# Patient Record
Sex: MALE | Race: WHITE
[De-identification: names, ages, dates, MRNs, and addresses within clinical notes are randomized per-mention and may not be internally consistent; named-entity substitution may affect disease eponyms.]

---

## 2019-08-12 LAB
ANION GAP SERPL CALC-SCNC: 13.6 MMOL/L (ref 8–16)
BASOPHILS NFR BLD AUTO: 0.2 % (ref 0–2)
BUN SERPL-MCNC: 26 MG/DL (ref 7–18)
CALCIUM SERPL-MCNC: 9.8 MG/DL (ref 8.5–10.1)
CHLORIDE SERPL-SCNC: 103 MMOL/L (ref 98–107)
CO2 SERPL-SCNC: 26.8 MMOL/L (ref 21–32)
CREAT SERPL-MCNC: 1.6 MG/DL (ref 0.6–1.3)
EOSINOPHIL NFR BLD: 1.9 % (ref 0–7)
ERYTHROCYTE [DISTWIDTH] IN BLOOD BY AUTOMATED COUNT: 13.3 % (ref 11.5–14.5)
GLUCOSE SERPL-MCNC: 87 MG/DL (ref 74–106)
HCT VFR BLD CALC: 40.9 % (ref 42–54)
HGB BLD-MCNC: 14.5 G/DL (ref 13.5–17.5)
IMM GRANULOCYTES NFR BLD: 0.3 % (ref 0–5)
LYMPHOCYTES NFR BLD AUTO: 24.1 % (ref 15–50)
MCH RBC QN AUTO: 31.6 PG (ref 26–34)
MCHC RBC AUTO-ENTMCNC: 35.5 G/DL (ref 31–37)
MCV RBC: 89.1 FL (ref 80–100)
MONOCYTES NFR BLD: 9.2 % (ref 2–11)
NEUTROPHILS NFR BLD AUTO: 64.3 % (ref 40–80)
OSMOLALITY SERPL CALC.SUM OF ELEC: 279 MOSM/KG (ref 275–300)
PLATELET # BLD: 222 10X3/UL (ref 130–400)
PMV BLD AUTO: 10.9 FL (ref 7.4–10.4)
POTASSIUM SERPL-SCNC: 5.4 MMOL/L (ref 3.5–5.1)
RBC # BLD AUTO: 4.59 10X6/UL (ref 4.2–6.1)
SODIUM SERPL-SCNC: 138 MMOL/L (ref 136–145)
WBC # BLD AUTO: 6.2 10X3/UL (ref 4.8–10.8)

## 2019-08-13 ENCOUNTER — HOSPITAL ENCOUNTER (OUTPATIENT)
Dept: HOSPITAL 84 - D.OPS | Age: 84
Discharge: HOME | End: 2019-08-13
Attending: UROLOGY
Payer: MEDICARE

## 2019-08-13 VITALS — HEIGHT: 69 IN | BODY MASS INDEX: 28.14 KG/M2 | WEIGHT: 190 LBS

## 2019-08-13 DIAGNOSIS — R97.20: Primary | ICD-10-CM

## 2019-08-13 DIAGNOSIS — N40.1: ICD-10-CM

## 2019-08-13 DIAGNOSIS — Z01.812: ICD-10-CM

## 2019-08-13 DIAGNOSIS — N32.89: ICD-10-CM

## 2019-08-13 DIAGNOSIS — N13.8: ICD-10-CM

## 2019-08-13 DIAGNOSIS — N32.3: ICD-10-CM

## 2019-08-13 NOTE — OP
PATIENT NAME:  VLADIMIR CARLIN JR                           MEDICAL RECORD: [id number]
:37                                             LOCATION:D.OPS          
                                                         ADMISSION DATE:        
SURGEON:  REMBERTO STOVER MD              
 
 
DATE OF OPERATION:  2019
 
SURGEON:  Remberto Stover MD
 
ANESTHESIA:  TIVA by Tor Trevino CRNA.
 
DIAGNOSES:  Elevated PSA of 5.6 on 2016 and obstructive BPH symptoms.
 
PROCEDURE:  Cystoscopy, transrectal ultrasound, and prostate biopsy.
 
FINDINGS:  On cystoscopy, bilateral lateral lobe hyperplasia with obstruction. 
There is a long prostatic urethra.  A small median lobe was seen.  The bladder
was heavily trabeculated with cellules and diverticula, no bladder tumors were
seen.  Single ureteral orifices bilaterally.  Transrectal ultrasound 45 gram
prostate with hypoechoic right base.
 
BLOOD LOSS:  Minimal.
 
SPECIMENS:  Prostate biopsy cores.
 
CLINICAL HISTORY:  This is an 84-year-old male with bladder outlet obstruction. 
His last PSA was found to be elevated at 5.6.  He has symptoms of urinary
frequency 12 times a day and with tamsulosin the frequency came down to 6-8
times per day.  He has nocturia times 2-3, which went down to nocturia times 1
with tamsulosin.  He is taking the tamsulosin twice a day and this is giving him
dizziness.  He also has urge incontinence, hesitancy and dribbling postvoid. 
His IPSS score is 14 and quality of life score is 5 on b.i.d. tamsulosin.  He
does not have a family history of prostate cancer.  On rectal examination, he
has an enlarged prostate about 60 grams with a nodule on the right prostatic
lateral lobe.  He is not allergic to any medications.  We gave him Ancef on call
to the OR.
 
DESCRIPTION OF PROCEDURE:  The patient was given IV sedation.  He was placed
into lithotomy position and prepped and draped.  The 17-French cystoscope was
used for visualization.  There are no penile urethral strictures.  The prostatic
urethra was obstructed due to the lateral lobes, which are long.  Some minimal
median lobe was seen.  The other findings are as outlined above.  The bladder
was then emptied through the cystoscope sheath and the scope was removed.
 
We then introduced the transrectal ultrasound probe and prostate size
measurements were obtained.  A size estimate of 45 grams was seen.  At the right
base was a hypoechoic area.  Sextant biopsies were obtained with at least 3
cores from each sextant.  Once all the specimens were obtained, we terminated
the procedure.  The patient was brought back to the preoperative holding area. 
I will see him in followup in 2 weeks' time.
 
TRANSINT:[id number] Voice Confirmation ID: [id number] DOCUMENT ID: [id number]
 
 
 
OPERATIVE REPORT                               [id number]    VLADIMIR CARLIN JR, REMBERTO ARBOLEDA MD              
 
 
 
Electronically Signed by REMBERTO STOVER on 19 at 1238
 
 
 
 
 
 
 
 
 
 
 
 
 
 
 
 
 
 
 
 
 
 
 
 
 
 
 
 
 
 
 
 
 
 
 
 
 
 
 
 
 
CC:                                                             [id number]
DICTATION DATE: 19 1155     :     19 1236      [facility] 
                                                                      [address] [place] [address]

## 2019-10-15 LAB
ANION GAP SERPL CALC-SCNC: 15.5 MMOL/L (ref 8–16)
APPEARANCE UR: CLEAR
APTT BLD: 42.9 SECONDS (ref 22.8–39.4)
BILIRUB SERPL-MCNC: NEGATIVE MG/DL
BUN SERPL-MCNC: 24 MG/DL (ref 7–18)
CALCIUM SERPL-MCNC: 9.1 MG/DL (ref 8.5–10.1)
CHLORIDE SERPL-SCNC: 106 MMOL/L (ref 98–107)
CO2 SERPL-SCNC: 27.5 MMOL/L (ref 21–32)
COLOR UR: YELLOW
CREAT SERPL-MCNC: 1.4 MG/DL (ref 0.6–1.3)
ERYTHROCYTE [DISTWIDTH] IN BLOOD BY AUTOMATED COUNT: 13 % (ref 11.5–14.5)
GLUCOSE SERPL-MCNC: 148 MG/DL (ref 74–106)
GLUCOSE SERPL-MCNC: NEGATIVE MG/DL
HCT VFR BLD CALC: 38.2 % (ref 42–54)
HGB BLD-MCNC: 12.8 G/DL (ref 13.5–17.5)
INR PPP: 1.41 (ref 0.85–1.17)
KETONES UR STRIP-MCNC: NEGATIVE MG/DL
MCH RBC QN AUTO: 30.8 PG (ref 26–34)
MCHC RBC AUTO-ENTMCNC: 33.5 G/DL (ref 31–37)
MCV RBC: 92 FL (ref 80–100)
NITRITE UR-MCNC: NEGATIVE MG/ML
OSMOLALITY SERPL CALC.SUM OF ELEC: 293 MOSM/KG (ref 275–300)
PH UR STRIP: 5 [PH] (ref 5–6)
PLATELET # BLD: 233 10X3/UL (ref 130–400)
PMV BLD AUTO: 11.2 FL (ref 7.4–10.4)
POTASSIUM SERPL-SCNC: 5 MMOL/L (ref 3.5–5.1)
PROT UR-MCNC: NEGATIVE MG/DL
PROTHROMBIN TIME: 16.6 SECONDS (ref 11.6–15)
RBC # BLD AUTO: 4.15 10X6/UL (ref 4.2–6.1)
SODIUM SERPL-SCNC: 144 MMOL/L (ref 136–145)
SP GR UR STRIP: 1.01 (ref 1–1.02)
UROBILINOGEN UR-MCNC: NORMAL MG/DL
WBC # BLD AUTO: 4 10X3/UL (ref 4.8–10.8)

## 2019-10-17 ENCOUNTER — HOSPITAL ENCOUNTER (OUTPATIENT)
Dept: HOSPITAL 84 - D.OPS | Age: 84
Discharge: HOME | End: 2019-10-17
Attending: UROLOGY
Payer: MEDICARE

## 2019-10-17 VITALS — WEIGHT: 189 LBS | HEIGHT: 69 IN | BODY MASS INDEX: 27.99 KG/M2

## 2019-10-17 VITALS — DIASTOLIC BLOOD PRESSURE: 66 MMHG | SYSTOLIC BLOOD PRESSURE: 135 MMHG

## 2019-10-17 DIAGNOSIS — N13.8: ICD-10-CM

## 2019-10-17 DIAGNOSIS — N40.1: Primary | ICD-10-CM

## 2019-10-17 NOTE — NUR
0845-DISCHARGE CRITERIA MET. TEACHING ON PALACIOS DRAINING AND
POSITIONING.VERBALIZED UNDERSTANDING WITHOTH FURTHER QUESTIONS OR
CONCERNS. ESCORTED OUT VIA W/C WITH WIFE AWAITING TO DRIVE HOME.

## 2019-10-17 NOTE — OP
PATIENT NAME:  VLADIMIR CARLIN JR                           MEDICAL RECORD: [id number]
:37                                             LOCATION:D.OPS          
                                                         ADMISSION DATE:        
SURGEON:  REMBERTO STOVER MD              
 
 
DATE OF OPERATION:  10/17/2019
 
SURGEON:  Remberto Stover MD
 
ANESTHESIA:  TIVA by Onur Lou CRNA
 
DIAGNOSIS:  Obstructive benign prostatic hyperplasia.  PSA 5.6.
 
PROCEDURE:  Transrectal ultrasound shows a 45 gram prostate.  IPSS score is 14
and quality of life score is 5 on tamsulosin b.i.d.
 
PROCEDURE:  UroLift times 6 implants.
 
FINDINGS:  Long obstructive lateral prostatic lobes with no median lobe.  No
bladder tumors.
 
ESTIMATED BLOOD LOSS:  None.
 
CLINICAL HISTORY:  This is an 84-year-old male, who has obstructive BPH
symptoms.  He has an elevated PSA of 5.6 on 2016.  He has urinary
frequency 12 times a day and nocturia times 2-3.  This improved when he was
taking tamsulosin on a b.i.d. basis, but it has made him dizzy.  He has urge
incontinence also.  He has a prostate biopsy performed and the pathology was
benign.  On cystoscopy at the time of prostate biopsy, he was found to have
large long obstructive lateral lobes.  He is on Plavix and Coumadin for atrial
fibrillation.  We obtained cardiology clearance from Dr. Durán.  He has held
his Plavix and Coumadin for 5 days.  He comes today to have the UroLift
procedure done.  He is not allergic to any medications.  He was given Ancef on
call to the OR.
 
DESCRIPTION OF PROCEDURE:  The patient was given IV sedation.  After been given
some fentanyl, he had bradycardia down to about 30.  This was managed by
anesthesia, CRNA.  Once he was more stabilized, his legs were placed into
stirrups and he was prepped and draped.  The UroLift scope was introduced.  I
placed two UroLift units 1.5 cm distal to the bladder neck at the anterolateral
sulcus of the lateral lobe of the prostate.  This was placed with 1 unit on each
side.  Another 2 units were placed at the level of the verumontanum, at the
anterolateral sulcus with 1 unit being placed on each side.  Going back with the
obturator, it was apparent that there was still an obstruction in the mid
prostate.  At the mid urethral level of the mid prostate, we placed 1 unit on
each side.  This now opened up the prosthetic urethral channel.  The prostate is
vascular and we started to encounter a bit of bleeding from placement of the 6
UroLift implants.  I therefore placed a 16-French Cavanaugh catheter into the
bladder and inflated the balloon with 10 cc of sterile water.  This was put to
bag drainage.  The patient will go home today with the Cavanaugh catheter.  I will
see him in followup next week to remove the Cavanaugh catheter for a voiding trial.
 
TRANSINT:[id number] Voice Confirmation ID: [id number] DOCUMENT ID: [id number]
 
 
 
OPERATIVE REPORT                               [id number]    VLADIMIR CARLIN JR, REMBERTO ARBOLEDA MD              
 
 
 
Electronically Signed by REMBERTO STOVER on 10/17/19 at 0907
 
 
 
 
 
 
 
 
 
 
 
 
 
 
 
 
 
 
 
 
 
 
 
 
 
 
 
 
 
 
 
 
 
 
 
 
 
 
 
 
 
CC:                                                             [id number]
DICTATION DATE: 10/17/19 0810     :     10/17/19 09      [facility] 
                                                                      10/17/19
[facility]                                          
[address] [place] [address]

## 2019-10-23 ENCOUNTER — HOSPITAL ENCOUNTER (OUTPATIENT)
Dept: HOSPITAL 84 - D.LABREF | Age: 84
Discharge: HOME | End: 2019-10-23
Attending: UROLOGY
Payer: MEDICARE

## 2019-10-23 VITALS — BODY MASS INDEX: 27.9 KG/M2

## 2019-10-23 DIAGNOSIS — Z98.890: Primary | ICD-10-CM

## 2021-03-14 ENCOUNTER — HOSPITAL ENCOUNTER (OUTPATIENT)
Dept: HOSPITAL 84 - D.LABREF | Age: 86
Discharge: HOME | End: 2021-03-14
Attending: FAMILY MEDICINE
Payer: MEDICARE

## 2021-03-14 VITALS — BODY MASS INDEX: 27.9 KG/M2

## 2021-03-14 DIAGNOSIS — E11.9: ICD-10-CM

## 2021-03-14 DIAGNOSIS — R41.0: Primary | ICD-10-CM

## 2021-03-14 LAB
BILIRUB SERPL-MCNC: NEGATIVE MG/DL
KETONES UR STRIP-MCNC: NEGATIVE MG/DL
NITRITE UR-MCNC: NEGATIVE MG/ML
PH UR STRIP: 5 [PH] (ref 5–8)
UROBILINOGEN UR-MCNC: NORMAL MG/DL (ref ?–2)

## 2021-03-15 ENCOUNTER — HOSPITAL ENCOUNTER (OUTPATIENT)
Dept: HOSPITAL 84 - D.LABREF | Age: 86
Discharge: HOME | End: 2021-03-15
Attending: FAMILY MEDICINE
Payer: MEDICARE

## 2021-03-15 VITALS — BODY MASS INDEX: 27.9 KG/M2

## 2021-03-15 DIAGNOSIS — E11.9: Primary | ICD-10-CM

## 2021-03-15 LAB
ALBUMIN SERPL-MCNC: 2.9 G/DL (ref 3.4–5)
ALP SERPL-CCNC: 84 U/L (ref 30–120)
ALT SERPL-CCNC: 24 U/L (ref 10–68)
ANION GAP SERPL CALC-SCNC: 10.4 MMOL/L (ref 8–16)
BASOPHILS NFR BLD AUTO: 0.3 % (ref 0–2)
BILIRUB SERPL-MCNC: 0.41 MG/DL (ref 0.2–1.3)
BUN SERPL-MCNC: 16 MG/DL (ref 7–18)
CALCIUM SERPL-MCNC: 8.6 MG/DL (ref 8.5–10.1)
CHLORIDE SERPL-SCNC: 104 MMOL/L (ref 98–107)
CO2 SERPL-SCNC: 30.9 MMOL/L (ref 21–32)
CREAT SERPL-MCNC: 1.3 MG/DL (ref 0.6–1.3)
EOSINOPHIL NFR BLD: 2.9 % (ref 0–7)
ERYTHROCYTE [DISTWIDTH] IN BLOOD BY AUTOMATED COUNT: 16 % (ref 11.5–14.5)
GLOBULIN SER-MCNC: 3.5 G/L
GLUCOSE SERPL-MCNC: 157 MG/DL (ref 74–106)
HCT VFR BLD CALC: 28.2 % (ref 42–54)
HGB BLD-MCNC: 8.7 G/DL (ref 13.5–17.5)
IMM GRANULOCYTES NFR BLD: 0.5 % (ref 0–5)
LYMPHOCYTES # BLD: 1.3 10X3/UL (ref 1.32–3.57)
LYMPHOCYTES NFR BLD AUTO: 17.7 % (ref 15–50)
MCH RBC QN AUTO: 28.5 PG (ref 26–34)
MCHC RBC AUTO-ENTMCNC: 30.9 G/DL (ref 31–37)
MCV RBC: 92.5 FL (ref 80–100)
MONOCYTES NFR BLD: 7.5 % (ref 2–11)
NEUTROPHILS # BLD: 5.21 10X3/UL (ref 1.78–5.38)
NEUTROPHILS NFR BLD AUTO: 71.1 % (ref 40–80)
OSMOLALITY SERPL CALC.SUM OF ELEC: 284 MOSM/KG (ref 275–300)
PLATELET # BLD: 351 10X3/UL (ref 130–400)
PMV BLD AUTO: 12.1 FL (ref 7.4–10.4)
POTASSIUM SERPL-SCNC: 4.3 MMOL/L (ref 3.5–5.1)
PROT SERPL-MCNC: 6.4 G/DL (ref 6.4–8.2)
RBC # BLD AUTO: 3.05 10X6/UL (ref 4.2–6.1)
SODIUM SERPL-SCNC: 141 MMOL/L (ref 136–145)
WBC # BLD AUTO: 7.3 10X3/UL (ref 4.8–10.8)